# Patient Record
Sex: FEMALE | Race: WHITE | HISPANIC OR LATINO
[De-identification: names, ages, dates, MRNs, and addresses within clinical notes are randomized per-mention and may not be internally consistent; named-entity substitution may affect disease eponyms.]

---

## 2023-03-16 ENCOUNTER — DASHBOARD ENCOUNTERS (OUTPATIENT)
Age: 53
End: 2023-03-16

## 2023-03-16 ENCOUNTER — OFFICE VISIT (OUTPATIENT)
Dept: URBAN - METROPOLITAN AREA CLINIC 68 | Facility: CLINIC | Age: 53
End: 2023-03-16

## 2023-03-16 RX ORDER — LORAZEPAM 2 MG/1
2 TABLET ORAL AT BEDTIME
Status: ACTIVE | COMMUNITY

## 2023-03-16 RX ORDER — IBUPROFEN 800 MG/1
1 TABLET WITH FOOD OR MILK AS NEEDED TABLET, FILM COATED ORAL
Status: ACTIVE | COMMUNITY

## 2023-03-16 NOTE — HPI-MIGRATED HPI
General : generalize abdominal pain that radiates unginal region  Case of a 53 YOF that comes in today for follow up. She has been been having constant severe, 10/10 lower back. It radiates down the buttock. It also radiates to the lower abdomen and down the right inguinal region. She also has nauseas dizziness. She has chronic constipation. She denies melena hematochezia hematemesis coffee ground emesis.

## 2023-04-03 ENCOUNTER — TELEPHONE ENCOUNTER (OUTPATIENT)
Dept: URBAN - METROPOLITAN AREA CLINIC 68 | Facility: CLINIC | Age: 53
End: 2023-04-03

## 2023-04-11 ENCOUNTER — OFFICE VISIT (OUTPATIENT)
Dept: URBAN - METROPOLITAN AREA CLINIC 68 | Facility: CLINIC | Age: 53
End: 2023-04-11